# Patient Record
Sex: FEMALE | Race: ASIAN | NOT HISPANIC OR LATINO
[De-identification: names, ages, dates, MRNs, and addresses within clinical notes are randomized per-mention and may not be internally consistent; named-entity substitution may affect disease eponyms.]

---

## 2019-07-20 ENCOUNTER — APPOINTMENT (OUTPATIENT)
Dept: NEUROSURGERY | Facility: CLINIC | Age: 54
End: 2019-07-20
Payer: COMMERCIAL

## 2019-07-20 VITALS — WEIGHT: 136 LBS | HEIGHT: 62 IN | BODY MASS INDEX: 25.03 KG/M2

## 2019-07-20 DIAGNOSIS — M53.3 SACROCOCCYGEAL DISORDERS, NOT ELSEWHERE CLASSIFIED: ICD-10-CM

## 2019-07-20 DIAGNOSIS — Z78.9 OTHER SPECIFIED HEALTH STATUS: ICD-10-CM

## 2019-07-20 PROBLEM — Z00.00 ENCOUNTER FOR PREVENTIVE HEALTH EXAMINATION: Status: ACTIVE | Noted: 2019-07-20

## 2019-07-20 PROCEDURE — 99204 OFFICE O/P NEW MOD 45 MIN: CPT

## 2019-07-25 PROBLEM — M53.3 SI (SACROILIAC) JOINT DYSFUNCTION: Status: ACTIVE | Noted: 2019-07-25

## 2019-07-25 PROBLEM — Z78.9 NON-SMOKER: Status: ACTIVE | Noted: 2019-07-25

## 2019-07-25 PROBLEM — Z78.9 NO PERTINENT PAST MEDICAL HISTORY: Status: RESOLVED | Noted: 2019-07-25 | Resolved: 2019-07-25

## 2019-07-25 NOTE — HISTORY OF PRESENT ILLNESS
[de-identified] : Ms. Child is s/p L4/5 interbody fusion from a left sided transforaminal approach 9 years ago. She had very good improvement after surgery. About 5 months ago when she was doing some yard work she developed pain in the right side of her lower back and buttock down the leg in an L5 distribution. Overtime her radicular symptoms have improved although she continues to have right sided lower back, buttock, and upper thigh pain. \par \par

## 2019-07-25 NOTE — ASSESSMENT
[FreeTextEntry1] : I would like her to undergo an MRI of the lumbar spine with/without gado along with Dynamic xrays of the lumbar spine and xrays of the right hip. I will see her back once testing is completed. In the future she may need to undergo a CT of the lumbar spine to assess her fusion. If her pain persists a right SI joint injection may be needed to rule out SI joint dysfunction if the MRI can not explain her symptoms. She understood our discussion well.

## 2019-08-09 ENCOUNTER — APPOINTMENT (OUTPATIENT)
Dept: NEUROSURGERY | Facility: CLINIC | Age: 54
End: 2019-08-09
Payer: COMMERCIAL

## 2019-08-09 DIAGNOSIS — M47.816 SPONDYLOSIS W/OUT MYELOPATHY OR RADICULOPATHY, LUMBAR REGION: ICD-10-CM

## 2019-08-09 PROCEDURE — 99214 OFFICE O/P EST MOD 30 MIN: CPT

## 2019-08-12 VITALS — BODY MASS INDEX: 25.03 KG/M2 | WEIGHT: 136 LBS | HEIGHT: 62 IN

## 2019-08-12 PROBLEM — M47.816 DEGENERATIVE JOINT DISEASE (DJD) OF LUMBAR SPINE: Status: ACTIVE | Noted: 2019-07-25

## 2019-08-12 NOTE — DATA REVIEWED
[de-identified] : - Stable fusion surgery. Good position of the instrumentation. The rest of the spine is intact with good disc height.

## 2019-08-12 NOTE — HISTORY OF PRESENT ILLNESS
[FreeTextEntry1] : Ms. Child is doing well. She continues to swim. She is status post an L4/5 interbody fusion from a left sided transforaminal approach over 9 years ago. She is neurologically intact on today's exam. \par

## 2019-08-12 NOTE — ASSESSMENT
[FreeTextEntry1] : I am very happy to see Ms. Child doing well. She will continue to do swimming exercise. I will see her again for follow up as needed.

## 2022-02-27 NOTE — PHYSICAL EXAM
[General Appearance - Alert] : alert [General Appearance - In No Acute Distress] : in no acute distress [General Appearance - Well Nourished] : well nourished 56y/F with  ICH L thalamus, brain compression, cerebral edema  hydrocephalus, obstructive  Hypertension, prediabetes  superficial venous thrombosis R cephalic vein, L cephalic vein    PLAN:   NEURO: neurochecks q1h, PRN pain meds with acetaminophen, tramadol, NSAIDs, opiates  EVD @ 10cm H20 open to drain monitor ICPs, VPS next week  d/c bromocriptine tomorrow  EEG r/o seizures  CT head monday  REHAB:  physical therapy evaluation and management    EARLY MOB:  OOB to chair, ambulate    PULM:  Room air, incentive spirometry  CARDIO:  SBP goal 100-160mm Hg; cont amlodpine, hydralazine, labetalol, losartan  ENDO:  Blood sugar goals 140-180   GI:  bowel regimen  DIET: continue diet  RENAL:  Na goal 135-145, decrease 3%@30cc/hr, BMP this afternoon, fludrocortisone to 0.2mg BID, continue salt tabs   HEM/ONC: Hb stable  VTE Prophylaxis: SCDs, SQL, repeat UE dopplers 02/28  ID: afebrile, no leukocytosis; off cefepime (02/22 - 02/25  ) off linezolid (02/20- 02/24) for now - as per ID  Social: will update family    ATTENDING ATTESTATION:  I was physically present for the key portions of the evaluation and management (E/M) service provided.  I agree with the above history, physical and plan, which I have reviewed and edited where appropriate.    Patient at high risk for neurological deterioration or death due to:  ICU delirium, aspiration PNA, DVT / PE.  Critical care time:  I have personally provided 45 minutes of critical care time, excluding time spent on separate procedures.      Plan discussed with RN, house staff. [General Appearance - Well-Appearing] : healthy appearing [Oriented To Time, Place, And Person] : oriented to person, place, and time [No Tenderness to Palpation] : no spine tenderness on palpation 56y/F with  ICH L thalamus, brain compression, cerebral edema  hydrocephalus, obstructive  Hypertension, prediabetes  superficial venous thrombosis R cephalic vein, L cephalic vein    PLAN:   NEURO: neurochecks q1h, PRN pain meds with acetaminophen, tramadol, NSAIDs, opiates  EVD @ 10cm H20 open to drain monitor ICPs, VPS next week  d/c bromocriptine   EEG f/u results - d/c   CT head monday  REHAB:  physical therapy evaluation and management    EARLY MOB:  OOB to chair, ambulate    PULM:  Room air, incentive spirometry  CARDIO:  SBP goal 100-160mm Hg; cont amlodpine, hydralazine, labetalol, losartan  ENDO:  Blood sugar goals 140-180   GI:  bowel regimen   DIET: continue diet  RENAL:  Na goal 135-145, cont 3%@25cc/hr, BMP this afternoon, fludrocortisone to 0.2mg BID, continue salt tabs   HEM/ONC: Hb stable  VTE Prophylaxis: SCDs, SQL, repeat UE dopplers 02/28  ID: afebrile, no leukocytosis; off cefepime (02/22 - 02/25  ) off linezolid (02/20- 02/24) for now - as per ID  Social: will update family    ATTENDING ATTESTATION:  I was physically present for the key portions of the evaluation and management (E/M) service provided.  I agree with the above history, physical and plan, which I have reviewed and edited where appropriate.    Patient at high risk for neurological deterioration or death due to:  ICU delirium, aspiration PNA, DVT / PE.  Critical care time:  I have personally provided 45 minutes of critical care time, excluding time spent on separate procedures.      Plan discussed with RN, house staff. [No Pain with ROM] : no pain with motion in any direction [Full] : Full [Normal] : normal [Intact] : all reflexes within normal limits bilaterally [FreeTextEntry6] : 5/5 in both LE's however she does have some mild pain inhibited weakness in the proximal right leg.  [Straight-Leg Raise Test - Left] : straight leg raise of the left leg was negative [Straight-Leg Raise Test - Right] : straight leg raise  of the right leg was negative [FreeTextEntry2] : Mild Right SI joint tenderness.